# Patient Record
Sex: MALE | Race: ASIAN | NOT HISPANIC OR LATINO | URBAN - METROPOLITAN AREA
[De-identification: names, ages, dates, MRNs, and addresses within clinical notes are randomized per-mention and may not be internally consistent; named-entity substitution may affect disease eponyms.]

---

## 2020-06-26 ENCOUNTER — EMERGENCY (EMERGENCY)
Facility: HOSPITAL | Age: 55
LOS: 1 days | Discharge: HOME | End: 2020-06-26
Attending: STUDENT IN AN ORGANIZED HEALTH CARE EDUCATION/TRAINING PROGRAM
Payer: MEDICAID

## 2020-06-26 VITALS
RESPIRATION RATE: 18 BRPM | WEIGHT: 190.04 LBS | DIASTOLIC BLOOD PRESSURE: 104 MMHG | SYSTOLIC BLOOD PRESSURE: 186 MMHG | OXYGEN SATURATION: 99 % | TEMPERATURE: 99 F | HEART RATE: 92 BPM | HEIGHT: 71 IN

## 2020-06-26 VITALS
OXYGEN SATURATION: 100 % | TEMPERATURE: 99 F | HEART RATE: 74 BPM | DIASTOLIC BLOOD PRESSURE: 86 MMHG | RESPIRATION RATE: 18 BRPM | SYSTOLIC BLOOD PRESSURE: 154 MMHG

## 2020-06-26 LAB
ALBUMIN SERPL ELPH-MCNC: 4.6 G/DL — SIGNIFICANT CHANGE UP (ref 3.3–5)
ALP SERPL-CCNC: 17 U/L — LOW (ref 40–120)
ALT FLD-CCNC: 20 U/L — SIGNIFICANT CHANGE UP (ref 10–45)
ANION GAP SERPL CALC-SCNC: 11 MMOL/L — SIGNIFICANT CHANGE UP (ref 5–17)
ANION GAP SERPL CALC-SCNC: 12 MMOL/L — SIGNIFICANT CHANGE UP (ref 5–17)
APTT BLD: 31.7 SEC — SIGNIFICANT CHANGE UP (ref 27.5–36.3)
AST SERPL-CCNC: 23 U/L — SIGNIFICANT CHANGE UP (ref 10–40)
BASOPHILS # BLD AUTO: 0.1 K/UL — SIGNIFICANT CHANGE UP (ref 0–0.2)
BASOPHILS NFR BLD AUTO: 1.6 % — SIGNIFICANT CHANGE UP (ref 0–2)
BILIRUB SERPL-MCNC: 0.5 MG/DL — SIGNIFICANT CHANGE UP (ref 0.2–1.2)
BUN SERPL-MCNC: 10 MG/DL — SIGNIFICANT CHANGE UP (ref 7–23)
BUN SERPL-MCNC: 9 MG/DL — SIGNIFICANT CHANGE UP (ref 7–23)
CALCIUM SERPL-MCNC: 9.1 MG/DL — SIGNIFICANT CHANGE UP (ref 8.4–10.5)
CALCIUM SERPL-MCNC: 9.9 MG/DL — SIGNIFICANT CHANGE UP (ref 8.4–10.5)
CHLORIDE SERPL-SCNC: 96 MMOL/L — SIGNIFICANT CHANGE UP (ref 96–108)
CHLORIDE SERPL-SCNC: 98 MMOL/L — SIGNIFICANT CHANGE UP (ref 96–108)
CO2 SERPL-SCNC: 22 MMOL/L — SIGNIFICANT CHANGE UP (ref 22–31)
CO2 SERPL-SCNC: 23 MMOL/L — SIGNIFICANT CHANGE UP (ref 22–31)
CREAT SERPL-MCNC: 1.01 MG/DL — SIGNIFICANT CHANGE UP (ref 0.5–1.3)
CREAT SERPL-MCNC: 1.07 MG/DL — SIGNIFICANT CHANGE UP (ref 0.5–1.3)
EOSINOPHIL # BLD AUTO: 0.18 K/UL — SIGNIFICANT CHANGE UP (ref 0–0.5)
EOSINOPHIL NFR BLD AUTO: 2.9 % — SIGNIFICANT CHANGE UP (ref 0–6)
GLUCOSE SERPL-MCNC: 132 MG/DL — HIGH (ref 70–99)
GLUCOSE SERPL-MCNC: 88 MG/DL — SIGNIFICANT CHANGE UP (ref 70–99)
HCT VFR BLD CALC: 38.3 % — LOW (ref 39–50)
HGB BLD-MCNC: 13.2 G/DL — SIGNIFICANT CHANGE UP (ref 13–17)
IMM GRANULOCYTES NFR BLD AUTO: 0.3 % — SIGNIFICANT CHANGE UP (ref 0–1.5)
INR BLD: 0.97 RATIO — SIGNIFICANT CHANGE UP (ref 0.88–1.16)
LYMPHOCYTES # BLD AUTO: 1.83 K/UL — SIGNIFICANT CHANGE UP (ref 1–3.3)
LYMPHOCYTES # BLD AUTO: 29.3 % — SIGNIFICANT CHANGE UP (ref 13–44)
MCHC RBC-ENTMCNC: 32.2 PG — SIGNIFICANT CHANGE UP (ref 27–34)
MCHC RBC-ENTMCNC: 34.5 GM/DL — SIGNIFICANT CHANGE UP (ref 32–36)
MCV RBC AUTO: 93.4 FL — SIGNIFICANT CHANGE UP (ref 80–100)
MONOCYTES # BLD AUTO: 0.66 K/UL — SIGNIFICANT CHANGE UP (ref 0–0.9)
MONOCYTES NFR BLD AUTO: 10.6 % — SIGNIFICANT CHANGE UP (ref 2–14)
NEUTROPHILS # BLD AUTO: 3.45 K/UL — SIGNIFICANT CHANGE UP (ref 1.8–7.4)
NEUTROPHILS NFR BLD AUTO: 55.3 % — SIGNIFICANT CHANGE UP (ref 43–77)
NRBC # BLD: 0 /100 WBCS — SIGNIFICANT CHANGE UP (ref 0–0)
PLATELET # BLD AUTO: 290 K/UL — SIGNIFICANT CHANGE UP (ref 150–400)
POTASSIUM SERPL-MCNC: 4.7 MMOL/L — SIGNIFICANT CHANGE UP (ref 3.5–5.3)
POTASSIUM SERPL-MCNC: 5.4 MMOL/L — HIGH (ref 3.5–5.3)
POTASSIUM SERPL-SCNC: 4.7 MMOL/L — SIGNIFICANT CHANGE UP (ref 3.5–5.3)
POTASSIUM SERPL-SCNC: 5.4 MMOL/L — HIGH (ref 3.5–5.3)
PROT SERPL-MCNC: 6.8 G/DL — SIGNIFICANT CHANGE UP (ref 6–8.3)
PROTHROM AB SERPL-ACNC: 11.2 SEC — SIGNIFICANT CHANGE UP (ref 10–12.9)
RBC # BLD: 4.1 M/UL — LOW (ref 4.2–5.8)
RBC # FLD: 12.9 % — SIGNIFICANT CHANGE UP (ref 10.3–14.5)
SARS-COV-2 RNA SPEC QL NAA+PROBE: SIGNIFICANT CHANGE UP
SODIUM SERPL-SCNC: 130 MMOL/L — LOW (ref 135–145)
SODIUM SERPL-SCNC: 132 MMOL/L — LOW (ref 135–145)
TROPONIN T, HIGH SENSITIVITY RESULT: 15 NG/L — SIGNIFICANT CHANGE UP (ref 0–51)
WBC # BLD: 6.24 K/UL — SIGNIFICANT CHANGE UP (ref 3.8–10.5)
WBC # FLD AUTO: 6.24 K/UL — SIGNIFICANT CHANGE UP (ref 3.8–10.5)

## 2020-06-26 PROCEDURE — 85610 PROTHROMBIN TIME: CPT

## 2020-06-26 PROCEDURE — 85730 THROMBOPLASTIN TIME PARTIAL: CPT

## 2020-06-26 PROCEDURE — 80053 COMPREHEN METABOLIC PANEL: CPT

## 2020-06-26 PROCEDURE — 99285 EMERGENCY DEPT VISIT HI MDM: CPT

## 2020-06-26 PROCEDURE — 80048 BASIC METABOLIC PNL TOTAL CA: CPT

## 2020-06-26 PROCEDURE — 83690 ASSAY OF LIPASE: CPT

## 2020-06-26 PROCEDURE — 85027 COMPLETE CBC AUTOMATED: CPT

## 2020-06-26 PROCEDURE — 36415 COLL VENOUS BLD VENIPUNCTURE: CPT

## 2020-06-26 PROCEDURE — 93010 ELECTROCARDIOGRAM REPORT: CPT

## 2020-06-26 PROCEDURE — 99284 EMERGENCY DEPT VISIT MOD MDM: CPT | Mod: 25

## 2020-06-26 PROCEDURE — 84484 ASSAY OF TROPONIN QUANT: CPT

## 2020-06-26 PROCEDURE — 93005 ELECTROCARDIOGRAM TRACING: CPT

## 2020-06-26 PROCEDURE — 71045 X-RAY EXAM CHEST 1 VIEW: CPT

## 2020-06-26 PROCEDURE — 71045 X-RAY EXAM CHEST 1 VIEW: CPT | Mod: 26

## 2020-06-26 RX ORDER — ASPIRIN/CALCIUM CARB/MAGNESIUM 324 MG
324 TABLET ORAL ONCE
Refills: 0 | Status: COMPLETED | OUTPATIENT
Start: 2020-06-26 | End: 2020-06-26

## 2020-06-26 RX ORDER — SODIUM CHLORIDE 9 MG/ML
1000 INJECTION INTRAMUSCULAR; INTRAVENOUS; SUBCUTANEOUS ONCE
Refills: 0 | Status: COMPLETED | OUTPATIENT
Start: 2020-06-26 | End: 2020-06-26

## 2020-06-26 RX ADMIN — Medication 324 MILLIGRAM(S): at 17:16

## 2020-06-26 RX ADMIN — SODIUM CHLORIDE 1000 MILLILITER(S): 9 INJECTION INTRAMUSCULAR; INTRAVENOUS; SUBCUTANEOUS at 18:13

## 2020-06-26 NOTE — ED PROVIDER NOTE - PHYSICAL EXAMINATION
NAD, Hypertensive, Afebrile, Lungs clear. No chest wall tender or lesions. ABD soft, non tender. No CVA tender. No pitting edema. Neuro- intact.

## 2020-06-26 NOTE — ED ADULT NURSE NOTE - OBJECTIVE STATEMENT
54 year old male presenting to ED, sent by PCP, c/o chest discomfort. PMH includes HTN, HLD, DM. Pt A+Ox3, moves all four extremities, reports sharp stabbing midsternal chest pain radiating to left shoulder 3-4x in an hour, lasting for a few seconds, since yesterday. Pt denies experiencing this pain in the past, alleviating or exacerbating factors, and denies taking medication for these symptoms. Pt denies headache, dizziness, SOB, N/V, abdominal pain, urinary or bowel symptoms. EKG completed, pt attached to cardiac monitor. 54 year old male presenting to ED, sent by PCP, c/o chest discomfort. PMH includes HTN, HLD, DM. Pt A+Ox3, moves all four extremities, reports sharp stabbing midsternal chest pain radiating to left shoulder 3-4x in an hour, lasting for a few seconds, since yesterday. Pt reports he was sitting on his couch watching tv when the pain suddenly began. Pt denies experiencing this pain in the past, alleviating or exacerbating factors, and denies taking medication for these symptoms. Pt denies headache, dizziness, SOB, N/V, abdominal pain, urinary or bowel symptoms. EKG completed, pt attached to cardiac monitor.

## 2020-06-26 NOTE — ED PROVIDER NOTE - NSFOLLOWUPINSTRUCTIONS_ED_ALL_ED_FT
Keep continue your current medications.  Take Baby Aspirin 81mg daily.   Follow up with cardiology clinic, [phone], call Monday for appointment in 2days.  Follow up with your primary Dr. for reevaluation, call Monday for appointment.  Return for any concerns or worsening symptoms.

## 2020-06-26 NOTE — ED PROVIDER NOTE - PATIENT PORTAL LINK FT
You can access the FollowMyHealth Patient Portal offered by [facility] by registering at the following website: [email or web address]. By joining [facility]’s FollowMyHealth portal, you will also be able to view your health information using other applications (apps) compatible with our system.

## 2020-06-26 NOTE — ED ADULT NURSE REASSESSMENT NOTE - NS ED NURSE REASSESS COMMENT FT1
Repeat blood work collected and sent to lab. Pt A+Ox3, VSS, aware of plan of care, pending lab results.

## 2020-06-26 NOTE — ED PROVIDER NOTE - OBJECTIVE STATEMENT
55yo male pt with PMHx of HTN, HLD, DM, current smoker, c/o chest pain. Pt stated he's had intermittent sharp pain to mid chest with radiating pain to left shoulder since yesterday. Reported the pain's not related to food ingestion. Denies radiating pain to back. Denies SOB. Denies fever, chills, cough or congestion. Denies sensory changes or weakness to extremities. Denies ABD pain or N/V/D. Denies urinary problems. No previous stress test.   PMD- Dr. Yamila Uriarte ([facility])

## 2020-06-26 NOTE — ED PROVIDER NOTE - NSFOLLOWUPCLINICS_GEN_ALL_ED_FT
[facility]  Cardiology  [address] [place] [id number]  Phone: [phone]  Fax:   Follow Up Time:

## 2020-06-26 NOTE — ED PROVIDER NOTE - CLINICAL SUMMARY MEDICAL DECISION MAKING FREE TEXT BOX
Devi Whipple MD 54 M w/ PMH of HTN, HLD, DM, current smoker p/w chest pain that is sharp and intermittent on the L side of the chest radiates to the upper shoulder lasts for a few seconds at rest and nothing makes it better or worse having 3-4 episodes an hour. Pt has no sob, no nausea no vomiting, initial episode was at 6PM and has been happening over and over again last episode while in the waiting room, currently asymptomatic, pt w/ clear lungs, heart RRR, 2+ radial and DP pulses. no lower extremity edema. Plan for labs, ekg and troponin. Unlikely acs, given atypical story, EKG nonischemic but given the pts risk factors, will obtain labs,

## 2020-06-30 ENCOUNTER — APPOINTMENT (OUTPATIENT)
Dept: CARDIOLOGY | Facility: CLINIC | Age: 55
End: 2020-06-30
Payer: MEDICAID

## 2020-06-30 VITALS
SYSTOLIC BLOOD PRESSURE: 146 MMHG | OXYGEN SATURATION: 100 % | BODY MASS INDEX: 26.74 KG/M2 | WEIGHT: 191 LBS | HEART RATE: 85 BPM | DIASTOLIC BLOOD PRESSURE: 91 MMHG | HEIGHT: 71 IN

## 2020-06-30 DIAGNOSIS — F17.200 NICOTINE DEPENDENCE, UNSPECIFIED, UNCOMPLICATED: ICD-10-CM

## 2020-06-30 DIAGNOSIS — Z83.3 FAMILY HISTORY OF DIABETES MELLITUS: ICD-10-CM

## 2020-06-30 DIAGNOSIS — R07.9 CHEST PAIN, UNSPECIFIED: ICD-10-CM

## 2020-06-30 DIAGNOSIS — E78.5 HYPERLIPIDEMIA, UNSPECIFIED: ICD-10-CM

## 2020-06-30 DIAGNOSIS — I10 ESSENTIAL (PRIMARY) HYPERTENSION: ICD-10-CM

## 2020-06-30 DIAGNOSIS — R00.2 PALPITATIONS: ICD-10-CM

## 2020-06-30 PROBLEM — Z00.00 ENCOUNTER FOR PREVENTIVE HEALTH EXAMINATION: Status: ACTIVE | Noted: 2020-06-30

## 2020-06-30 PROCEDURE — 99205 OFFICE O/P NEW HI 60 MIN: CPT

## 2020-06-30 RX ORDER — AMLODIPINE BESYLATE 5 MG/1
5 TABLET ORAL DAILY
Qty: 4 | Refills: 3 | Status: ACTIVE | COMMUNITY

## 2020-06-30 RX ORDER — GLIMEPIRIDE 4 MG/1
TABLET ORAL
Refills: 0 | Status: ACTIVE | COMMUNITY

## 2020-06-30 RX ORDER — SITAGLIPTIN 100 MG/1
TABLET, FILM COATED ORAL
Refills: 0 | Status: ACTIVE | COMMUNITY

## 2020-06-30 RX ORDER — METFORMIN HYDROCHLORIDE 1000 MG/1
1000 TABLET, COATED ORAL
Refills: 0 | Status: ACTIVE | COMMUNITY

## 2020-06-30 RX ORDER — DICLOFENAC SODIUM 75 MG/1
75 TABLET, DELAYED RELEASE ORAL
Qty: 1 | Refills: 1 | Status: ACTIVE | COMMUNITY

## 2020-06-30 RX ORDER — RAMIPRIL 10 MG/1
10 CAPSULE ORAL DAILY
Refills: 0 | Status: ACTIVE | COMMUNITY

## 2020-06-30 RX ORDER — FENOFIBRATE 150 MG/1
CAPSULE ORAL
Refills: 0 | Status: ACTIVE | COMMUNITY

## 2020-06-30 NOTE — REASON FOR VISIT
[Initial Evaluation] : an initial evaluation of [Chest Pain] : chest pain [Hyperlipidemia] : hyperlipidemia [Hypertension] : hypertension

## 2020-06-30 NOTE — HISTORY OF PRESENT ILLNESS
[FreeTextEntry1] : Ruslan is a 54-year-old gentleman active smoker, alcohol use,  DM, htn, hyperlipidemia s/p ER visit for chest pain. No episodes since. Father had CABG. He describes it as a fleeting sharp pain to left shoulder intermittently for 24 hours.

## 2020-06-30 NOTE — DISCUSSION/SUMMARY
[FreeTextEntry1] : The patient is a 54-year-old gentleman smoker, diabetes mellitus, hypertension, hyperlipidemia, with an episode of atypical chest pain intermittent for a day. \par #1 CV- normal ECG, nuclear stress \par #2 Htn- ramipril and amlodipine\par #3 Lipids- on fenofibrate\par #4 DM- metformin, januvia\par #5 General- smoking cessation, regular daily exercise.

## 2020-06-30 NOTE — PHYSICAL EXAM
[General Appearance - Well Developed] : well developed [Well Groomed] : well groomed [Normal Appearance] : normal appearance [General Appearance - Well Nourished] : well nourished [No Deformities] : no deformities [General Appearance - In No Acute Distress] : no acute distress [Eyelids - No Xanthelasma] : the eyelids demonstrated no xanthelasmas [Normal Conjunctiva] : the conjunctiva exhibited no abnormalities [Normal Oral Mucosa] : normal oral mucosa [No Oral Pallor] : no oral pallor [No Oral Cyanosis] : no oral cyanosis [Normal Jugular Venous A Waves Present] : normal jugular venous A waves present [Normal Jugular Venous V Waves Present] : normal jugular venous V waves present [No Jugular Venous Frias A Waves] : no jugular venous frias A waves [Heart Sounds] : normal S1 and S2 [Heart Rate And Rhythm] : heart rate and rhythm were normal [Murmurs] : no murmurs present [Respiration, Rhythm And Depth] : normal respiratory rhythm and effort [Exaggerated Use Of Accessory Muscles For Inspiration] : no accessory muscle use [Auscultation Breath Sounds / Voice Sounds] : lungs were clear to auscultation bilaterally [Abdomen Soft] : soft [Abdomen Tenderness] : non-tender [Abdomen Mass (___ Cm)] : no abdominal mass palpated [Abnormal Walk] : normal gait [Gait - Sufficient For Exercise Testing] : the gait was sufficient for exercise testing [Nail Clubbing] : no clubbing of the fingernails [Cyanosis, Localized] : no localized cyanosis [Skin Color & Pigmentation] : normal skin color and pigmentation [Petechial Hemorrhages (___cm)] : no petechial hemorrhages [No Venous Stasis] : no venous stasis [] : no rash [Skin Lesions] : no skin lesions [No Skin Ulcers] : no skin ulcer [No Xanthoma] : no  xanthoma was observed [Oriented To Time, Place, And Person] : oriented to person, place, and time [Mood] : the mood was normal [Affect] : the affect was normal [No Anxiety] : not feeling anxious

## 2020-07-17 ENCOUNTER — APPOINTMENT (OUTPATIENT)
Dept: CV DIAGNOSTICS | Facility: HOSPITAL | Age: 55
End: 2020-07-17

## 2020-08-02 ENCOUNTER — TRANSCRIPTION ENCOUNTER (OUTPATIENT)
Age: 55
End: 2020-08-02

## 2020-10-01 ENCOUNTER — APPOINTMENT (OUTPATIENT)
Dept: CV DIAGNOSTICS | Facility: HOSPITAL | Age: 55
End: 2020-10-01

## 2021-04-21 ENCOUNTER — APPOINTMENT (OUTPATIENT)
Dept: ORTHOPEDIC SURGERY | Facility: CLINIC | Age: 56
End: 2021-04-21
Payer: MEDICAID

## 2021-04-21 VITALS
HEIGHT: 71 IN | SYSTOLIC BLOOD PRESSURE: 147 MMHG | BODY MASS INDEX: 25.2 KG/M2 | DIASTOLIC BLOOD PRESSURE: 79 MMHG | TEMPERATURE: 97.8 F | WEIGHT: 180 LBS | HEART RATE: 76 BPM

## 2021-04-21 DIAGNOSIS — Z86.39 PERSONAL HISTORY OF OTHER ENDOCRINE, NUTRITIONAL AND METABOLIC DISEASE: ICD-10-CM

## 2021-04-21 DIAGNOSIS — M72.0 PALMAR FASCIAL FIBROMATOSIS [DUPUYTREN]: ICD-10-CM

## 2021-04-21 DIAGNOSIS — Z80.9 FAMILY HISTORY OF MALIGNANT NEOPLASM, UNSPECIFIED: ICD-10-CM

## 2021-04-21 PROCEDURE — 99203 OFFICE O/P NEW LOW 30 MIN: CPT

## 2021-04-21 PROCEDURE — 99072 ADDL SUPL MATRL&STAF TM PHE: CPT

## 2021-04-21 RX ORDER — GLIMEPIRIDE 4 MG/1
TABLET ORAL
Refills: 0 | Status: ACTIVE | COMMUNITY

## 2021-04-21 RX ORDER — IBUPROFEN 800 MG/1
TABLET, FILM COATED ORAL
Refills: 0 | Status: ACTIVE | COMMUNITY

## 2021-04-21 RX ORDER — CALCIUM CARBONATE/VITAMIN D3 500-10/5ML
400 LIQUID (ML) ORAL
Refills: 0 | Status: ACTIVE | COMMUNITY

## 2021-04-21 NOTE — PHYSICAL EXAM
[de-identified] : - Constitutional: This is a male in no obvious distress.  \par - Psych: Patient is alert and oriented to person, place and time.  Patient has a normal mood and affect.\par - Cardiovascular: Normal pulses throughout the upper extremities.  No significant varicosities are noted in the upper extremities. \par - Neuro: Strength and sensation are intact throughout the upper extremities.  Patient has normal coordination.\par - Respiratory:  Patient exhibits no evidence of shortness of breath or difficulty breathing.\par - Skin: No rashes, lesions, or other abnormalities are noted in the upper extremities.\par \par ---\par  \par Examination of his right hand demonstrates Dupuytren's disease involving the ulnar aspect of the palm.  There is pitting and cords without contracture.  He has full flexion extension the digits.  He is neurovascularly intact distally\par \par Examination of his left hand demonstrates a small Dupuytren's nodule ulnarly.  He has full flexion extension per there is no contracture.  He is neurovascularly intact distally.

## 2021-04-21 NOTE — HISTORY OF PRESENT ILLNESS
[Right] : right hand dominant [FreeTextEntry1] : He comes in today for evaluation of bilateral hand nodules. He developed a nodule at the right ring finger 6 months ago which has since been increasing in size. He more recently developed a small nodule at the left ring finger 10 days ago. He has associated stiffness of all the fingers of his right hand. He denies any associated pain. \par \par He has a history of type 2 diabetes. \par \par He was referred by his PCP, Dr. Yamila Uriarte.

## 2021-04-21 NOTE — DISCUSSION/SUMMARY
[FreeTextEntry1] : He has findings consistent with Dupuytren's disease involving the right greater than left hands without a contracture.\par \par I had a discussion regarding today's visit, the diagnosis, and treatment recommendations / options. At this time, as he is not having any associated pain or dysfunction, I have recommended observation. I discussed the nature of Dupuytren's disease with him, and the potential for contracture. If he develops a contracture or symptoms, then he will return to the office to discuss further treatment recommendations. \par \par The patient has agreed to this plan of management and has expressed full understanding.  All questions were fully answered to the patient's satisfaction.\par \par I spent at least 30 minutes in total on this patient's visit. This included: Preparation for the visit, review of the medical records, review of pertinent diagnostic studies, examination and counseling of the patient on the above diagnosis, treatment plan and prognosis, orders of diagnostic tests, medication and/or appropriate procedures and documentation in the medical records of today's visit.

## 2021-04-21 NOTE — CONSULT LETTER
[Dear  ___] : Dear  [unfilled], [Consult Letter:] : I had the pleasure of evaluating your patient, [unfilled]. [Please see my note below.] : Please see my note below. [Consult Closing:] : Thank you very much for allowing me to participate in the care of this patient.  If you have any questions, please do not hesitate to contact me. [Sincerely,] : Sincerely, [FreeTextEntry3] : Cornell Howard M.D.\par Surgery of the Hand & Upper Extremity\par Orthopaedic Surgery\par Chief, Hand Service, [facility]\par Director, Hand Service, [facility]\par  of Orthopedic Surgery, [facility] [employer] [facility] \par [facility] Email: [email or web address] Office Phone: [phone]  Expiration Date For Kenalog (Optional): FEB 2022 Total Volume (Ccs): 1 Concentration Of Kenalog Solution Injected (Mg/Ml): 2.5 Consent: The risks of atrophy were reviewed with the patient. Administered By (Optional): Libertad Melvin PA-C X Size Of Lesion In Cm (Optional): 0 Lot # For Kenalog (Optional): [id number] Medical Necessity Clause: This procedure was medically necessary because the lesions that were treated were: Detail Level: Detailed Kenalog Preparation: Kenalog Include Z78.9 (Other Specified Conditions Influencing Health Status) As An Associated Diagnosis?: No

## 2021-04-21 NOTE — ADDENDUM
[FreeTextEntry1] : I, Sarita Martinez, acted solely as a scribe for Dr. Howard on this date 04/21/2021.

## 2021-04-21 NOTE — END OF VISIT
[FreeTextEntry3] : This note was written by Sarita Martinez on 04/21/2021 acting solely as a scribe for Dr. Cornell Howard.\par  \par All medical record entries made by the Scribe were at my, Dr. Cornell Howard, direction and personally dictated by me on 04/21/2021. I have personally reviewed the chart and agree that the record accurately reflects my personal performance of the history, physical exam, assessment and plan.

## 2021-05-14 ENCOUNTER — APPOINTMENT (OUTPATIENT)
Dept: ORTHOPEDIC SURGERY | Facility: CLINIC | Age: 56
End: 2021-05-14
Payer: MEDICAID

## 2021-05-14 VITALS
TEMPERATURE: 97.8 F | SYSTOLIC BLOOD PRESSURE: 163 MMHG | WEIGHT: 190 LBS | BODY MASS INDEX: 26.5 KG/M2 | HEART RATE: 83 BPM | DIASTOLIC BLOOD PRESSURE: 93 MMHG

## 2021-05-14 DIAGNOSIS — M54.16 RADICULOPATHY, LUMBAR REGION: ICD-10-CM

## 2021-05-14 DIAGNOSIS — M43.10 SPONDYLOLISTHESIS, SITE UNSPECIFIED: ICD-10-CM

## 2021-05-14 PROCEDURE — 96372 THER/PROPH/DIAG INJ SC/IM: CPT

## 2021-05-14 PROCEDURE — 99214 OFFICE O/P EST MOD 30 MIN: CPT | Mod: 25

## 2021-05-14 RX ORDER — GABAPENTIN 100 MG/1
100 CAPSULE ORAL
Qty: 30 | Refills: 2 | Status: ACTIVE | COMMUNITY
Start: 2021-05-14 | End: 1900-01-01

## 2021-05-14 RX ORDER — KETOROLAC TROMETHAMINE 15 MG/ML
15 INJECTION, SOLUTION INTRAMUSCULAR; INTRAVENOUS
Refills: 0 | Status: COMPLETED | OUTPATIENT
Start: 2021-05-14

## 2021-05-14 RX ORDER — METHYLPREDNISOLONE 4 MG/1
4 TABLET ORAL
Qty: 1 | Refills: 0 | Status: ACTIVE | COMMUNITY
Start: 2021-05-14 | End: 1900-01-01

## 2021-05-14 RX ADMIN — KETOROLAC TROMETHAMINE 0 MG/ML: 30 INJECTION, SOLUTION INTRAMUSCULAR; INTRAVENOUS at 00:00

## 2021-05-14 RX ADMIN — KETOROLAC TROMETHAMINE 2 MG/ML: 15 INJECTION, SOLUTION INTRAMUSCULAR; INTRAVENOUS at 00:00

## 2021-05-19 NOTE — HISTORY OF PRESENT ILLNESS
[Worsening] : worsening [9] : a current pain level of 9/10 [Bending] : worsened by bending [Lifting] : worsened by lifting [Sitting] : worsened by sitting [Heat] : relieved by heat [___ mths] : [unfilled] month(s) ago [de-identified] : Patient presents today for pain to right hip for 2 months.  Patient states the pain radiates to right calf.  Denies any injury.  PCP ordered a x-ray and MRI of right hip.  Patient states he took Ibuprofen in the past but had to stop and now takes Tylenol (started 1 week ago).  Tylenol 500mg  is not as effective and does not last as long with the relief.\par History of low back pain\par sees nephrologist for proteinuria [de-identified] : any movement, hard surfaces

## 2021-05-19 NOTE — DISCUSSION/SUMMARY
[Medication Risks Reviewed] : Medication risks reviewed [de-identified] : The patient has symptoms lumbar radiculopathy in the setting of grade 1 spondylolisthesis L4-5 as well as disc bulges at L4-5 and L5-S1 levels.  Recommended trial of gabapentin for this.  Also prescribed an oral steroids.  For his pain today offered him an injection of Toradol and he wanted to proceed.  Under sterile conditions 30 mg of Toradol was administered intramuscularly by the LPN without incident.  I will see him back for follow-up in 2 to 4 weeks on as-needed basis.  If his symptoms persist or worsen lumbar epidural steroid injections may be considered at that time.  He is not interested in formal physical therapy but may continue a self-directed exercise program.\par \par The patient was educated regarding their condition, treatment options as well as prescribed course of treatment. \par Risks and benefits as well as alternatives to the proposed treatment were also provided to the patient \par They were given the opportunity to have all their questions answered to their satisfaction.\par \par Vital signs were reviewed with the patient and the patient was instructed to followup with their primary care provider for further management.\par \par Healthy lifestyle recommendations were also made including a tobacco free lifestyle, proper diet, and weight control.

## 2021-05-19 NOTE — PHYSICAL EXAM
[Antalgic] : antalgic [SLR] : positive straight leg raise [LE] : Sensory: Intact in bilateral lower extremities [1+] : left ankle jerk 1+ [DP] : dorsalis pedis 2+ and symmetric bilaterally [Plantar Reflex Right Only] : absent on the right [Plantar Reflex Left Only] : absent on the left [DTR Reflexes Clonus Of Right Ankle (___ Beats)] : absent on the right [DTR Reflexes Clonus Of Left Ankle (___ Beats)] : absent on the left [de-identified] : The pt is awake, alert and oriented to self, place and time, is comfortable and in no acute distress. Inspection of neck, back and lower extremities bilaterally reveals no rashes or ecchymotic lesions.  There is no obvious abnormal spinal curvature in the sagittal and coronal planes. There is no tenderness over the cervical, thoracic or lumbar spine, or the paraspinal or upper and lower extremities musculature. There is no sacroiliac tenderness. No greater trochanteric tenderness bilaterally. No atrophy or abnormal movements noted in the upper or lower extremities. There is no swelling noted in the upper or lower extremities bilaterally. No cervical lymphadenopathy noted anteriorly. No joint laxity noted in the upper and lower extremity joints bilaterally.\par Hip range of motion is degrees internal rotation 30° external rotation without pain. Full range of motion of the shoulders bilaterally with no significant pain\par There is no groin pain with hip internal rotation and a negative LEENA test bilaterally.  [de-identified] : dupuytrens bilateral wrist\par flex to knees with right buttock pain, ext 30 degrees with right buttock pain\par right gluteal tenderness [de-identified] : ROSHAN MORGAN MD\par [address]par [facility] [place] [address]\par SITE PERFORMED: [facility]\par SITE PHONE: [phone]\par Patient: BRADLEY KINGSLEY\par YOB: 1965\par Phone: [phone]\par MRN: [id number] Acc: [id number]\par Date of Exam: 11-\par  \par EXAM:  X-RAY LUMBAR SPINE 2 OR 3 VIEWS\par \par HISTORY:  Low back pain.\par \par TECHNIQUE:  3 radiographic views of the lumbar spine were obtained.\par \par COMPARISON:  None. \par \par FINDINGS:  \par \par Vertebrae: There is normal alignment of the spine without evidence for fracture. There is no evidence of spondylolysis, or spondylolisthesis. There are small to moderate multilevel anterolateral osteophytes. No significant lumbar scoliosis. There is posterior facet hypertrophy in the lower lumbar spine.\par \par Number of lumbar vertebrae: 5.\par \par Disc spaces: Preserved.\par \par Sacroiliac joints: Normal.\par \par Soft tissues: Normal.\par \par IMPRESSION: Mild multilevel degenerative disease.\par \par Thank you for the opportunity to participate in the care of this patient.  \par  \par CHASE CARLISLE MD  - Electronically Signed: 11- 2:13 PM \par Physician to Physician Direct Line is: [phone]\par \par __________\par \par \par Exam requested by:\par JAX PEARL MD\par [address]\par [facility] [address]\par SITE PERFORMED: [facility]\par Patient: BRADLEY KINGSLEY\par YOB: 1965\par Phone: [phone]\par MRN: [id number] Acc: [id number]\par Date of Exam: 04-\par  \par EXAM:  MRI LUMBAR SPINE WITHOUT CONTRAST\par \par HISTORY:  Low back pain dating back to trauma December 2020.\par \par TECHNIQUE:  MR imaging of the lumbar spine performed with axial T1 and T2 sequences as well as sagittal T1, T2 and STIR sequences. No contrast administered.\par \par COMPARISON:  Plain films November 18, 2020.\par \par FINDINGS:  \par \par RETROPERITONEUM AND SOFT TISSUES: \par Retroperitoneal structures demonstrate normal appearance. \par Paraspinal musculature demonstrates symmetric size and signal. \par No superficial soft tissue abnormalities identified.\par \par OSSEOUS STRUCTURES: \par Vertebral bodies demonstrate normal marrow signal. \par Slight grade 1 spondylolisthesis of L4 on L5. And mild disc desiccation. Facet arthropathy noted to moderate degree at L4-L5.\par No evidence for aggressive osseous lesions appreciated. No evidence for fracture. \par Conus medullaris noted posterior to T12-L1.\par \par L1-L2 level demonstrates no disc bulge or herniation. No foraminal impingement or canal stenosis.\par \par L2-L3 level demonstrates no disc bulge or herniation. No foraminal impingement or canal stenosis.\par \par L3-L4 level demonstrates no disc bulge or herniation. No foraminal impingement or canal stenosis.\par \par L4-L5 level demonstrates a slight grade 1 spondylolisthesis with pseudodisc bulge and right foraminal annular fissure causing impingement upon the anterior thecal sac and bilateral neural foramina. Contact with the exiting bilateral L4 spinal nerves. Mild canal stenosis.\par \par L5-S1 level demonstrates a disc bulge causing impingement upon the epidural fat with right lateral recess annular fissure. Upon the anterior bilateral S1 nerve roots.\par \par IMPRESSION:  \par 1.  L4-L5 level demonstrates a slight grade 1 spondylolisthesis with pseudodisc bulge and right foraminal annular fissure causing impingement upon the anterior thecal sac and bilateral neural foramina. Contact with the exiting bilateral L4 spinal nerves. Mild canal stenosis.\par 2.  L5-S1 level demonstrates a disc bulge causing impingement upon the epidural fat with right lateral recess annular fissure. Upon the anterior bilateral S1 nerve roots.\par \par Thank you for the opportunity to participate in the care of this patient.  \par  \par ROMAINE ARANA MD  - Electronically Signed: 04- 3:39 PM \par Physician to Physician Direct Line is: [phone]\par Copy to:ROBERT WALLS\par Copy to:ONE CALL MEDICAL ONE CALL [facility]ICAL\par ________\par 	\par 	\par Exam requested by:\par JAX PEARL MD\par [address]\par [facility] [address]\par SITE PERFORMED: [facility]\par Patient: BRADLEY KINGSLEY\par YOB: 1965\par Phone: [phone]\par MRN: [id number] Acc: [id number]\par Date of Exam: 04-\par  \par EXAM:  MRI CERVICAL SPINE WITHOUT CONTRAST\par \par HISTORY:  Neck pain status post trauma.\par \par TECHNIQUE:  MR imaging of the cervical spine performed with axial gradient echo and T2 fat suppressed as well as sagittal T1, T2 and STIR sequences. No contrast.\par \par COMPARISON:  None.\par \par FINDINGS:  \par \par POSTERIOR FOSSA AND CERVICAL SOFT TISSUES: \par Posterior fossa structures demonstrate normal appearance without evidence for herniation. \par Paraspinal musculature demonstrates symmetric size and signal. \par No superficial soft tissue abnormalities identified. \par Normal appearance to the vascular structures. No evidence for cervical adenopathy.\par \par OSSEOUS STRUCTURES: \par Vertebral bodies demonstrate grossly normal marrow signal.\par Normal cervical lordosis. Disc desiccation and moderate spondylosis noted in the cervical spine, most prominent from C4 to C7. Mild facet arthropathy throughout the cervical spine.\par No evidence for fracture or spondylolisthesis.\par No evidence for aggressive osseous lesions appreciated. \par \par C2-C3 level demonstrates a posterior central focal herniation with annular fissures causing impingement upon the anterior thecal sac.\par \par C3-C4 level demonstrates a disc bulge causing impingement upon the anterior thecal sac and bilateral neural foramina. \par \par C4-C5 level demonstrates a disc bulge with posterior central annular fissure causing impingement upon the anterior thecal sac and bilateral neural foramina.\par \par C5-C6 level demonstrates a broad-based disc bulge with left paracentral focal herniation causing impingement upon the anterior thecal sac and cord as well as bilateral neural foramina.\par \par C6-C7 level demonstrates a broad-based disc bulge causing impingement upon the anterior thecal sac, anterior cord and bilateral neural foramina, right greater than left.\par \par C7-T1 level demonstrates no disc bulge or herniation. No foraminal impingement or canal stenosis.\par \par IMPRESSION:  \par 1.  C2-C3 level demonstrates a posterior central focal herniation with annular fissures causing impingement upon the anterior thecal sac.\par 2.  C3-C4 level demonstrates a disc bulge causing impingement upon the anterior thecal sac and bilateral neural foramina. \par 3.  C4-C5 level demonstrates a disc bulge with posterior central annular fissure causing impingement upon the anterior thecal sac and bilateral neural foramina.\par 4.  C5-C6 level demonstrates a broad-based disc bulge with left paracentral focal herniation causing impingement upon the anterior thecal sac and cord as well as bilateral neural foramina.\par 5.  C6-C7 level demonstrates a broad-based disc bulge causing impingement upon the anterior thecal sac, anterior cord and bilateral neural foramina, right greater than left.\par \par Thank you for the opportunity to participate in the care of this patient.  \par  \par ROMAINE ARANA MD  - Electronically Signed: 04- 4:22 PM \par Physician to Physician Direct Line is: [phone]\par Copy to:ROBERT WALLS\par Copy to:ONE CALL MEDICAL ONE CALL MEDICAL\par \par \par __________________\par \par Exam requested by:\par ROBERT WALLS \par [address]par [place] [address]\par SITE PERFORMED: [facility]\par Patient: BRADLEY KINGSLEY\par YOB: 1965\par Phone: [phone]\par MRN: [id number] Acc: [id number]\par Date of Exam: 04-\par  \par EXAM:  MRI RIGHT FEMUR WITHOUT CONTRAST\par \par HISTORY:  Right hip pain. Proximal femoral stress fracture questioned on recent x-ray.\par \par TECHNIQUE: Multiplane, multisequence MRI of the right femur was performed utilizing standard protocol. \par \par COMPARISON:  Right hip x-ray from 4/15/2021\par \par FINDINGS:  \par Osseous: There is no evidence of acute fracture. There is no stress related or other focal bone marrow signal abnormality. There is no suspect osseous lesion identified. There is mild enthesophyte formation along the medial subtrochanteric femoral diaphysis at the described abnormality on recent x-ray. Articular: There is minimal cystic change adjacent to the superior right acetabular labrum. There is no significant joint effusion. The articular cartilage is well preserved. The pubic symphysis and visualized sacroiliac joints are unremarkable.\par \par Muscle/tendons: There is mild abnormal signal present within the common hamstrings tendon compatible with tendinosis and low-grade partial tear. The remainder of the visualized muscles and tendons are of normal signal intensity. There is no bursal or other focal fluid collection.\par \par Neurovascular: The fat plane surrounding the sciatic nerve is preserved.\par \par Superficial soft tissues and other findings: There is no pelvic mass lesion or gross hernia identified. A small right scrotal hydrocele is noted. The superficial soft tissues are within normal limits.\par \par IMPRESSION:  \par 1. No fracture or stress related osseous injury identified. The described cortical thickening along the medial aspect of the proximal femoral diaphysis is compatible with enthesophyte formation.\par 2. Minimal cystic change adjacent to the superior right acetabular labrum which may reflect paralabral cystic change secondary to underlying labral tear, insufficiently evaluated on the current study. The hip joint is otherwise unremarkable.\par 3. Mild tendinosis and low-grade partial tearing of the common hamstrings tendon.\par 4. Right scrotal hydrocele noted.\par \par \par Thank you for the opportunity to participate in the care of this patient.  \par  \par ROMAINE HERNANDES DO  - Electronically Signed: 04- 4:23 PM \par Physician to Physician Direct Line is: [phone]

## 2021-05-19 NOTE — REASON FOR VISIT
[Follow-Up Visit] : a follow-up visit for [Back Pain] : back pain [FreeTextEntry2] : Low back pain and right hip pain.

## 2021-10-06 PROBLEM — I10 ESSENTIAL HYPERTENSION: Status: ACTIVE | Noted: 2020-06-30

## 2025-04-23 ENCOUNTER — OUTPATIENT (OUTPATIENT)
Dept: OUTPATIENT SERVICES | Facility: HOSPITAL | Age: 60
LOS: 1 days | End: 2025-04-23
Payer: MEDICAID

## 2025-04-23 ENCOUNTER — APPOINTMENT (OUTPATIENT)
Dept: CT IMAGING | Facility: CLINIC | Age: 60
End: 2025-04-23

## 2025-04-23 DIAGNOSIS — R07.89 OTHER CHEST PAIN: ICD-10-CM

## 2025-04-23 PROCEDURE — 75574 CT ANGIO HRT W/3D IMAGE: CPT | Mod: 26

## 2025-04-23 PROCEDURE — 75574 CT ANGIO HRT W/3D IMAGE: CPT

## 2025-06-17 ENCOUNTER — TRANSCRIPTION ENCOUNTER (OUTPATIENT)
Age: 60
End: 2025-06-17

## 2025-06-17 ENCOUNTER — OUTPATIENT (OUTPATIENT)
Dept: OUTPATIENT SERVICES | Facility: HOSPITAL | Age: 60
LOS: 1 days | End: 2025-06-17
Payer: MEDICAID

## 2025-06-17 VITALS
DIASTOLIC BLOOD PRESSURE: 84 MMHG | SYSTOLIC BLOOD PRESSURE: 167 MMHG | OXYGEN SATURATION: 98 % | HEART RATE: 75 BPM | RESPIRATION RATE: 17 BRPM | TEMPERATURE: 98 F

## 2025-06-17 VITALS
DIASTOLIC BLOOD PRESSURE: 99 MMHG | WEIGHT: 169.98 LBS | OXYGEN SATURATION: 100 % | HEART RATE: 94 BPM | HEIGHT: 71 IN | SYSTOLIC BLOOD PRESSURE: 160 MMHG | RESPIRATION RATE: 15 BRPM | TEMPERATURE: 98 F

## 2025-06-17 DIAGNOSIS — Z98.890 OTHER SPECIFIED POSTPROCEDURAL STATES: Chronic | ICD-10-CM

## 2025-06-17 DIAGNOSIS — R93.1 ABNORMAL FINDINGS ON DIAGNOSTIC IMAGING OF HEART AND CORONARY CIRCULATION: ICD-10-CM

## 2025-06-17 LAB
ANION GAP SERPL CALC-SCNC: 18 MMOL/L — HIGH (ref 5–17)
BUN SERPL-MCNC: 14 MG/DL — SIGNIFICANT CHANGE UP (ref 7–23)
CALCIUM SERPL-MCNC: 9.9 MG/DL — SIGNIFICANT CHANGE UP (ref 8.4–10.5)
CHLORIDE SERPL-SCNC: 98 MMOL/L — SIGNIFICANT CHANGE UP (ref 96–108)
CO2 SERPL-SCNC: 19 MMOL/L — LOW (ref 22–31)
CREAT SERPL-MCNC: 1.04 MG/DL — SIGNIFICANT CHANGE UP (ref 0.5–1.3)
EGFR: 83 ML/MIN/1.73M2 — SIGNIFICANT CHANGE UP
EGFR: 83 ML/MIN/1.73M2 — SIGNIFICANT CHANGE UP
GLUCOSE BLDC GLUCOMTR-MCNC: 114 MG/DL — HIGH (ref 70–99)
GLUCOSE BLDC GLUCOMTR-MCNC: 126 MG/DL — HIGH (ref 70–99)
GLUCOSE SERPL-MCNC: 104 MG/DL — HIGH (ref 70–99)
HCT VFR BLD CALC: 46.7 % — SIGNIFICANT CHANGE UP (ref 39–50)
HGB BLD-MCNC: 16.3 G/DL — SIGNIFICANT CHANGE UP (ref 13–17)
MAGNESIUM SERPL-MCNC: 2.2 MG/DL — SIGNIFICANT CHANGE UP (ref 1.6–2.6)
MCHC RBC-ENTMCNC: 32.4 PG — SIGNIFICANT CHANGE UP (ref 27–34)
MCHC RBC-ENTMCNC: 34.9 G/DL — SIGNIFICANT CHANGE UP (ref 32–36)
MCV RBC AUTO: 92.8 FL — SIGNIFICANT CHANGE UP (ref 80–100)
NRBC BLD AUTO-RTO: 0 /100 WBCS — SIGNIFICANT CHANGE UP (ref 0–0)
PLATELET # BLD AUTO: 247 K/UL — SIGNIFICANT CHANGE UP (ref 150–400)
POTASSIUM SERPL-MCNC: 4.5 MMOL/L — SIGNIFICANT CHANGE UP (ref 3.5–5.3)
POTASSIUM SERPL-SCNC: 4.5 MMOL/L — SIGNIFICANT CHANGE UP (ref 3.5–5.3)
RBC # BLD: 5.03 M/UL — SIGNIFICANT CHANGE UP (ref 4.2–5.8)
RBC # FLD: 12.3 % — SIGNIFICANT CHANGE UP (ref 10.3–14.5)
SODIUM SERPL-SCNC: 135 MMOL/L — SIGNIFICANT CHANGE UP (ref 135–145)
WBC # BLD: 5.81 K/UL — SIGNIFICANT CHANGE UP (ref 3.8–10.5)
WBC # FLD AUTO: 5.81 K/UL — SIGNIFICANT CHANGE UP (ref 3.8–10.5)

## 2025-06-17 PROCEDURE — 82962 GLUCOSE BLOOD TEST: CPT

## 2025-06-17 PROCEDURE — 93010 ELECTROCARDIOGRAM REPORT: CPT

## 2025-06-17 PROCEDURE — 93005 ELECTROCARDIOGRAM TRACING: CPT

## 2025-06-17 PROCEDURE — 93454 CORONARY ARTERY ANGIO S&I: CPT | Mod: 26,59

## 2025-06-17 PROCEDURE — C1874: CPT

## 2025-06-17 PROCEDURE — 36415 COLL VENOUS BLD VENIPUNCTURE: CPT

## 2025-06-17 PROCEDURE — C1894: CPT

## 2025-06-17 PROCEDURE — C1887: CPT

## 2025-06-17 PROCEDURE — 93454 CORONARY ARTERY ANGIO S&I: CPT | Mod: 59

## 2025-06-17 PROCEDURE — 99152 MOD SED SAME PHYS/QHP 5/>YRS: CPT

## 2025-06-17 PROCEDURE — 80048 BASIC METABOLIC PNL TOTAL CA: CPT

## 2025-06-17 PROCEDURE — 83735 ASSAY OF MAGNESIUM: CPT

## 2025-06-17 PROCEDURE — 92928 PRQ TCAT PLMT NTRAC ST 1 LES: CPT | Mod: LD,59

## 2025-06-17 PROCEDURE — C9600: CPT | Mod: LD

## 2025-06-17 PROCEDURE — C1769: CPT

## 2025-06-17 PROCEDURE — 85027 COMPLETE CBC AUTOMATED: CPT

## 2025-06-17 RX ORDER — CLOPIDOGREL BISULFATE 75 MG/1
1 TABLET, FILM COATED ORAL
Qty: 90 | Refills: 3
Start: 2025-06-17 | End: 2026-06-11

## 2025-06-17 RX ORDER — ASPIRIN 325 MG
0 TABLET ORAL
Refills: 0 | DISCHARGE

## 2025-06-17 RX ORDER — ASPIRIN 325 MG
1 TABLET ORAL
Refills: 0 | DISCHARGE

## 2025-06-17 RX ORDER — SITAGLIPTIN 100 MG/1
1 TABLET, FILM COATED ORAL
Refills: 0 | DISCHARGE

## 2025-06-17 RX ORDER — INSULIN LISPRO 100 U/ML
INJECTION, SOLUTION INTRAVENOUS; SUBCUTANEOUS
Refills: 0 | Status: DISCONTINUED | OUTPATIENT
Start: 2025-06-17 | End: 2025-07-01

## 2025-06-17 RX ORDER — LISINOPRIL 5 MG/1
20 TABLET ORAL DAILY
Refills: 0 | Status: DISCONTINUED | OUTPATIENT
Start: 2025-06-17 | End: 2025-07-01

## 2025-06-17 RX ORDER — AMLODIPINE BESYLATE 10 MG/1
1 TABLET ORAL
Refills: 0 | DISCHARGE

## 2025-06-17 RX ORDER — GLIMEPIRIDE 4 MG/1
1 TABLET ORAL
Refills: 0 | DISCHARGE

## 2025-06-17 RX ORDER — METFORMIN HYDROCHLORIDE 850 MG/1
1 TABLET ORAL
Refills: 0 | DISCHARGE

## 2025-06-17 RX ORDER — DEXTROSE 50 % IN WATER 50 %
12.5 SYRINGE (ML) INTRAVENOUS ONCE
Refills: 0 | Status: DISCONTINUED | OUTPATIENT
Start: 2025-06-17 | End: 2025-07-01

## 2025-06-17 RX ORDER — DEXTROSE 50 % IN WATER 50 %
25 SYRINGE (ML) INTRAVENOUS ONCE
Refills: 0 | Status: DISCONTINUED | OUTPATIENT
Start: 2025-06-17 | End: 2025-07-01

## 2025-06-17 RX ORDER — DEXTROSE 50 % IN WATER 50 %
15 SYRINGE (ML) INTRAVENOUS ONCE
Refills: 0 | Status: DISCONTINUED | OUTPATIENT
Start: 2025-06-17 | End: 2025-07-01

## 2025-06-17 RX ORDER — METFORMIN HYDROCHLORIDE 850 MG/1
1 TABLET ORAL
Qty: 0 | Refills: 0 | DISCHARGE

## 2025-06-17 RX ORDER — SODIUM CHLORIDE 9 G/1000ML
1000 INJECTION, SOLUTION INTRAVENOUS
Refills: 0 | Status: DISCONTINUED | OUTPATIENT
Start: 2025-06-17 | End: 2025-07-01

## 2025-06-17 RX ORDER — RAMIPRIL 2.5 MG/1
1 CAPSULE ORAL
Refills: 0 | DISCHARGE

## 2025-06-17 RX ORDER — GLUCAGON 3 MG/1
1 POWDER NASAL ONCE
Refills: 0 | Status: DISCONTINUED | OUTPATIENT
Start: 2025-06-17 | End: 2025-07-01

## 2025-06-17 RX ORDER — AMLODIPINE BESYLATE 10 MG/1
10 TABLET ORAL DAILY
Refills: 0 | Status: DISCONTINUED | OUTPATIENT
Start: 2025-06-17 | End: 2025-07-01

## 2025-06-17 RX ORDER — FENOFIBRATE 160 MG/1
1 TABLET ORAL
Refills: 0 | DISCHARGE

## 2025-06-17 RX ADMIN — Medication 750 MILLILITER(S): at 08:36

## 2025-06-17 RX ADMIN — Medication 75 MILLILITER(S): at 12:14

## 2025-06-17 RX ADMIN — LISINOPRIL 20 MILLIGRAM(S): 5 TABLET ORAL at 12:13

## 2025-06-17 RX ADMIN — Medication 75 MILLILITER(S): at 08:36

## 2025-06-17 NOTE — ASU DISCHARGE PLAN (ADULT/PEDIATRIC) - FINANCIAL ASSISTANCE
[facility] provides services at a reduced cost to those who are determined to be eligible through [facility]’s financial assistance program. Information regarding [facility]’s financial assistance program can be found by going to [email or web address] or by calling [phone].

## 2025-06-17 NOTE — H&P CARDIOLOGY - HISTORY OF PRESENT ILLNESS
THis is a 60y/o male with no known implantable devices noted with PMHX of HTN, HLD , DM 2 compliant with meds uncomplicated and current tobacco smoking . Pt went to Cardiologist Zane Bravo and had Abnormal CTA . Pt with recent complaints of CP intermittent left sided "sharp"  with exertion on and off over last 2 months. Now presents for [facility] this am with Dr. Garcia . Currently CP free. No sob no palpitations no lightheadedness noted.   < from: CT Angio Heart and Coronaries w/ IV Cont (04.23.25 @ 10:42) >    Cardiovascular:    Coronary arteries:  Coronary artery calcium Agatston score:  Left main (LM) coronary artery:  16  Left anterior descending (LAD) coronary artery:  361  Left circumflex (LCX) coronary artery:  659  Right coronary artery (RCA):  484  Total:  1520    Co-dominant coronary circulation.    The LM coronary artery: Minimal (<25%) calcific plaque in the distal   segment.    The LAD coronary artery: Minimal (<25%) calcific plaque at the ostium.   Mild (25-49%) calcific plaque in the proximal segment. Severe (>70%)   partially calcified plaque in the mid segment.  The diagonal branch: Heavily calcified plaques with indeterminate lesions   severity due to blooming artifact.    The LCX coronary artery: Moderate (50-69%) partially calcified plaques in   the proximal and mid segment. Heavily calcified plaque in the distal   segment with indeterminate lesions severity due to blooming artifact.  The first obtuse marginal (OM) branch is not well visualized.  The obtuse marginal (OM) branch is angiographically normal.    < end of copied text >

## 2025-06-17 NOTE — ASU PATIENT PROFILE, ADULT - FALL HARM RISK - UNIVERSAL INTERVENTIONS
Bed in lowest position, wheels locked, appropriate side rails in place/Call bell, personal items and telephone in reach/Instruct patient to call for assistance before getting out of bed or chair/Non-slip footwear when patient is out of bed/[place] to call system/Physically safe environment - no spills, clutter or unnecessary equipment/Purposeful Proactive Rounding/Room/bathroom lighting operational, light cord in reach

## 2025-06-17 NOTE — ASU DISCHARGE PLAN (ADULT/PEDIATRIC) - NS MD DC FALL RISK RISK
For information on Fall & Injury Prevention, visit: [email or web address] OR  [email or web address] OR  https://www.cdc.gov/steadi/patient.html